# Patient Record
Sex: MALE | Race: WHITE | NOT HISPANIC OR LATINO | URBAN - METROPOLITAN AREA
[De-identification: names, ages, dates, MRNs, and addresses within clinical notes are randomized per-mention and may not be internally consistent; named-entity substitution may affect disease eponyms.]

---

## 2019-11-21 ENCOUNTER — EMERGENCY (EMERGENCY)
Facility: HOSPITAL | Age: 19
LOS: 1 days | Discharge: HOME | End: 2019-11-21
Attending: EMERGENCY MEDICINE | Admitting: EMERGENCY MEDICINE
Payer: MEDICAID

## 2019-11-21 VITALS
SYSTOLIC BLOOD PRESSURE: 129 MMHG | RESPIRATION RATE: 18 BRPM | HEIGHT: 66 IN | HEART RATE: 82 BPM | DIASTOLIC BLOOD PRESSURE: 72 MMHG | OXYGEN SATURATION: 99 % | TEMPERATURE: 98 F | WEIGHT: 139.99 LBS

## 2019-11-21 PROCEDURE — 71046 X-RAY EXAM CHEST 2 VIEWS: CPT | Mod: 26

## 2019-11-21 PROCEDURE — 93010 ELECTROCARDIOGRAM REPORT: CPT

## 2019-11-21 PROCEDURE — 93005 ELECTROCARDIOGRAM TRACING: CPT

## 2019-11-21 PROCEDURE — 71046 X-RAY EXAM CHEST 2 VIEWS: CPT

## 2019-11-21 PROCEDURE — 99283 EMERGENCY DEPT VISIT LOW MDM: CPT | Mod: 25

## 2019-11-21 PROCEDURE — 99284 EMERGENCY DEPT VISIT MOD MDM: CPT

## 2019-11-21 RX ORDER — METHOCARBAMOL 500 MG/1
2 TABLET, FILM COATED ORAL
Qty: 40 | Refills: 0
Start: 2019-11-21 | End: 2019-11-30

## 2019-11-21 RX ORDER — METHOCARBAMOL 500 MG/1
1500 TABLET, FILM COATED ORAL ONCE
Refills: 0 | Status: COMPLETED | OUTPATIENT
Start: 2019-11-21 | End: 2019-11-21

## 2019-11-21 RX ORDER — LIDOCAINE 4 G/100G
1 CREAM TOPICAL ONCE
Refills: 0 | Status: COMPLETED | OUTPATIENT
Start: 2019-11-21 | End: 2019-11-21

## 2019-11-21 RX ADMIN — LIDOCAINE 1 PATCH: 4 CREAM TOPICAL at 19:03

## 2019-11-21 RX ADMIN — METHOCARBAMOL 1500 MILLIGRAM(S): 500 TABLET, FILM COATED ORAL at 19:04

## 2019-11-21 NOTE — ED ADULT NURSE NOTE - NSIMPLEMENTINTERV_GEN_ALL_ED
Implemented All Universal Safety Interventions:  [place] to call system. Call bell, personal items and telephone within reach. Instruct patient to call for assistance. Room bathroom lighting operational. Non-slip footwear when patient is off stretcher. Physically safe environment: no spills, clutter or unnecessary equipment. Stretcher in lowest position, wheels locked, appropriate side rails in place.

## 2019-11-21 NOTE — ED PROVIDER NOTE - NSFOLLOWUPCLINICS_GEN_ALL_ED_FT
[facility]  Family Medicine  [address]  [place], [place] [address]  Phone: [phone]  Fax:   Follow Up Time: 1-3 Days

## 2019-11-21 NOTE — ED PROVIDER NOTE - CLINICAL SUMMARY MEDICAL DECISION MAKING FREE TEXT BOX
avss. nontoxic. NAD. no active cp. no acute resp distress. no red flags. no risk factors. likely msk given recent fall. s/p robaxin. will dc home w/ outpatient pcp bowen gavin, strict return precautions. pt agrees w/ plan. questions answered. avss. nontoxic. NAD. no active cp. no acute resp distress. no red flags. no risk factors. pain only w/ movement. likely msk given recent fall. s/p robaxin. will dc home w/ outpatient pcp bowen gavin, strict return precautions. pt agrees w/ plan. questions answered.

## 2019-11-21 NOTE — ED PROVIDER NOTE - PATIENT PORTAL LINK FT
You can access the FollowMyHealth Patient Portal offered by [facility] by registering at the following website: [email or web address]. By joining [employer]’s FollowMyHealth portal, you will also be able to view your health information using other applications (apps) compatible with our system.

## 2019-11-21 NOTE — ED ADULT NURSE NOTE - OBJECTIVE STATEMENT
Patient c/o generalized chest pain, upper back pain- radiating to bilateral shoulders [id number], intermittent in nature. Pt states "Sometimes when I'm moving around or I shrug my shoulders, I can feel my chest crack." Patient also states "I feel it and hear my chest crack." When asked to explain these situations, patient states that he feels like "my bones are breaking." Pt denies PMH. Pt denies accident or injury, denies LOC, denies N/V. Patient states "I woke up one day and it just started happening." Pt aox3 and ambulatory.

## 2019-11-21 NOTE — ED PROVIDER NOTE - NSFOLLOWUPINSTRUCTIONS_ED_ALL_ED_FT
PLEASE FOLLOW-UP WITH YOUR PCP IN 2-3 DAYS IF RECURRENT/OTHER CONCERNING SYMPTOMS.                      BACK PAIN - Ambulatory Care     Back Pain    AMBULATORY CARE:    Back pain is common. You may feel sore or stiff on one or both sides of your back. The pain may spread to your buttocks or thighs. Back pain may be caused by an injury, lack of exercise, or obesity. Repeated bending, lifting, twisting, or lifting heavy items can also cause back pain.    Seek care immediately if:     You have pain, numbness, or weakness in one or both legs.      Your pain becomes so severe that you cannot walk.      You cannot control your urine or bowel movements.      You have severe back pain with chest pain.      You have severe back pain, nausea, and vomiting.      You have severe back pain that spreads to your side or genital area.    Contact your healthcare provider if:     You have back pain that does not get better with rest and pain medicine.      You have a fever.      You have pain that worsens when you are on your back or when you rest.      You have pain that worsens when you cough or sneeze.      You lose weight without trying.      You have questions or concerns about your condition or care.    Treatment for back pain may include any of the following:     NSAIDs help decrease swelling and pain. This medicine is available with or without a doctor's order. NSAIDs can cause stomach bleeding or kidney problems in certain people. If you take blood thinner medicine, always ask your healthcare provider if NSAIDs are safe for you. Always read the medicine label and follow directions.      Acetaminophen decreases pain and fever. It is available without a doctor's order. Ask how much to take and how often to take it. Follow directions. Read the labels of all other medicines you are using to see if they also contain acetaminophen, or ask your doctor or pharmacist. Acetaminophen can cause liver damage if not taken correctly. Do not use more than 4 grams (4,000 milligrams) total of acetaminophen in one day.       Muscle relaxers help decrease muscle spasms and back pain.      Prescription pain medicine may be given. Ask your healthcare provider how to take this medicine safely. Some prescription pain medicines contain acetaminophen. Do not take other medicines that contain acetaminophen without talking to your healthcare provider. Too much acetaminophen may cause liver damage. Prescription pain medicine may cause constipation. Ask your healthcare provider how to prevent or treat constipation.     Manage your back pain:     Apply ice on your back for 15 to 20 minutes every hour or as directed. Use an ice pack, or put crushed ice in a plastic bag. Cover it with a towel before you apply it to your skin. Ice helps prevent tissue damage and decreases pain.      Apply heat on your back for 20 to 30 minutes every 2 hours for as many days as directed. Heat helps decrease pain and muscle spasms.      Stay active as much as you can without causing more pain. Bed rest could make your back pain worse. Avoid heavy lifting until your pain is gone.      Go to physical therapy as directed. A physical therapist can teach you exercises to help improve movement and strength, and to decrease pain.    Follow up with your healthcare provider in 2 weeks, or as directed: Write down your questions so you remember to ask them during your visits.       © Copyright [employer] 2019       back to top                      © Copyright [employer] 2019

## 2019-11-21 NOTE — ED PROVIDER NOTE - OBJECTIVE STATEMENT
19M otherwise healthy, c/o 1m recurrent nonradiating upper back/bl scapular achy/stiffness pain worse w/ movement/BUE use. +occasional nonradiating nonpleuritic sharp bl parasternal pain. at rest pt has no sx. s/p mechanical fall 1m ago w/ reportedly unremarkable cxr/ekg. no fever/chills, no sob, no wheezing, no abd pain/n/v, no radicular sx, no extremity pain/paresthesia/weakness, no etoh/ivdu, no rash, no trauma, no phx/fhx cardiac disease, no heavy lifting, no increased anxiety/stress.

## 2019-11-21 NOTE — ED ADULT NURSE NOTE - CADM POA URETHRAL CATHETER
PDMP reviewed:  Last Med ck:7/11/17  Last refilled: 8/12/17--mom aware won't be able to fill until next week   No

## 2019-11-21 NOTE — ED ADULT TRIAGE NOTE - CHIEF COMPLAINT QUOTE
shoulder neck and back pain x 1 month and radiates to chest. worse with movement. did not see doctor for this

## 2019-11-21 NOTE — ED PROVIDER NOTE - CARE PROVIDER_API CALL
Sophia Carlson)  Cardiovascular Disease; Internal Medicine  [address] [id number]  Phone: [phone]  Fax: [phone]  Follow Up Time: 1-3 Days

## 2019-11-21 NOTE — ED PROVIDER NOTE - PHYSICAL EXAMINATION
CONST: nontoxic NAD speaking in full sentences  HEAD: atraumatic  EYES: conjunctivae clear  ENT: mmm  NECK: supple/FROM, nttp  CARD: rrr no murmurs, chest wall nttp, no rash/atrumatic  CHEST: ctab no r/r/w, no stridor/retractions/tripoding  BACK: no midline ttp, no rash, atraumatic  EXT: FROM, symmetric distal pulses intact  SKIN: warm, dry, no rash, no pedal edema, cap refill <2sec  NEURO: a+ox3, 5/5 strength x4, gross sensation intact x4, normal gait

## 2019-11-27 DIAGNOSIS — R07.89 OTHER CHEST PAIN: ICD-10-CM

## 2019-11-27 DIAGNOSIS — M54.6 PAIN IN THORACIC SPINE: ICD-10-CM

## 2024-09-10 NOTE — ED ADULT NURSE NOTE - NS_ED_NURSE_TEACHING_TOPIC_ED_A_ED
Health Maintenance       Meningococcal Vaccine (2 - 2-dose series)  Order placed this encounter    COVID-19 Vaccine (1 - 2023-24 season)  Never done    Influenza Vaccine (1)  Due since 9/1/2024    Depression Screening (Yearly)  Due since 9/7/2024    Annual Physical (ages 3-18) (Yearly)  Due since 9/7/2024           Following review of the above:  Pended orders    Note: Refer to final orders and clinician documentation.       follow up with PMD/cardio